# Patient Record
Sex: FEMALE | URBAN - METROPOLITAN AREA
[De-identification: names, ages, dates, MRNs, and addresses within clinical notes are randomized per-mention and may not be internally consistent; named-entity substitution may affect disease eponyms.]

---

## 2019-06-04 ENCOUNTER — OFFICE VISIT (OUTPATIENT)
Dept: URBAN - METROPOLITAN AREA CLINIC 18 | Facility: CLINIC | Age: 74
End: 2019-06-04
Payer: COMMERCIAL

## 2019-06-04 PROCEDURE — 92004 COMPRE OPH EXAM NEW PT 1/>: CPT | Performed by: OPTOMETRIST

## 2019-06-04 ASSESSMENT — VISUAL ACUITY
OS: 20/50
OD: 20/70

## 2019-06-04 ASSESSMENT — KERATOMETRY
OS: 44.50
OD: 44.13

## 2019-06-04 ASSESSMENT — INTRAOCULAR PRESSURE
OD: 15
OS: 18

## 2019-06-04 NOTE — IMPRESSION/PLAN
Impression: Age-related nuclear cataract, bilateral: H25.13. Plan: Cataracts account for the patient's complaints. Patient understands changing glasses will not improve vision. Recommend cataract evaluation with cataract surgeon. Briefly discussed advanced technology.

## 2019-06-17 ENCOUNTER — OFFICE VISIT (OUTPATIENT)
Dept: URBAN - METROPOLITAN AREA CLINIC 17 | Facility: CLINIC | Age: 74
End: 2019-06-17
Payer: COMMERCIAL

## 2019-06-17 PROCEDURE — 92004 COMPRE OPH EXAM NEW PT 1/>: CPT | Performed by: OPHTHALMOLOGY

## 2019-06-17 RX ORDER — OFLOXACIN 3 MG/ML
0.3 % SOLUTION/ DROPS OPHTHALMIC
Qty: 1 | Refills: 1 | Status: INACTIVE
Start: 2019-06-17 | End: 2019-08-12

## 2019-06-17 RX ORDER — PREDNISOLONE ACETATE 10 MG/ML
1 % SUSPENSION/ DROPS OPHTHALMIC
Qty: 1 | Refills: 1 | Status: INACTIVE
Start: 2019-06-17 | End: 2019-08-20

## 2019-06-17 ASSESSMENT — VISUAL ACUITY: OD: 20/70

## 2019-06-17 ASSESSMENT — INTRAOCULAR PRESSURE
OS: 14
OD: 15

## 2019-06-17 NOTE — IMPRESSION/PLAN
Impression: Age-related nuclear cataract, bilateral: H25.13. Condition: established, worsening. Symptoms: could improve with surgery. Vision: vision affected. Plan: Cataract accounts for patient's complaints. Reviewed risks, benefits, and procedure. Patient desires surgery, schedule ce/iol OD then OS, RL2, discussed lens options, distance refractive target, patient is clear for surgery in [place] [id number].

## 2019-07-18 ENCOUNTER — PRE-OPERATIVE VISIT (OUTPATIENT)
Dept: URBAN - METROPOLITAN AREA CLINIC 17 | Facility: CLINIC | Age: 74
End: 2019-07-18
Payer: COMMERCIAL

## 2019-07-18 DIAGNOSIS — H25.13 AGE-RELATED NUCLEAR CATARACT, BILATERAL: Primary | ICD-10-CM

## 2019-07-18 ASSESSMENT — PACHYMETRY
OS: 2.79
OD: 2.71
OS: 22.65
OD: 22.69

## 2019-08-05 ENCOUNTER — SURGERY (OUTPATIENT)
Dept: URBAN - METROPOLITAN AREA SURGERY 7 | Facility: SURGERY | Age: 74
End: 2019-08-05
Payer: COMMERCIAL

## 2019-08-05 PROCEDURE — 66984 XCAPSL CTRC RMVL W/O ECP: CPT | Performed by: OPHTHALMOLOGY

## 2019-08-06 ENCOUNTER — POST-OPERATIVE VISIT (OUTPATIENT)
Dept: URBAN - METROPOLITAN AREA CLINIC 18 | Facility: CLINIC | Age: 74
End: 2019-08-06

## 2019-08-06 DIAGNOSIS — Z09 ENCNTR FOR F/U EXAM AFT TRTMT FOR COND OTH THAN MALIG NEOPLM: Primary | ICD-10-CM

## 2019-08-06 PROCEDURE — 99024 POSTOP FOLLOW-UP VISIT: CPT | Performed by: OPTOMETRIST

## 2019-08-06 ASSESSMENT — INTRAOCULAR PRESSURE
OD: 12
OS: 11

## 2019-08-12 ENCOUNTER — POST-OPERATIVE VISIT (OUTPATIENT)
Dept: URBAN - METROPOLITAN AREA CLINIC 18 | Facility: CLINIC | Age: 74
End: 2019-08-12

## 2019-08-12 PROCEDURE — 99024 POSTOP FOLLOW-UP VISIT: CPT | Performed by: OPTOMETRIST

## 2019-08-12 ASSESSMENT — INTRAOCULAR PRESSURE
OS: 17
OD: 18

## 2019-08-19 ENCOUNTER — SURGERY (OUTPATIENT)
Dept: URBAN - METROPOLITAN AREA SURGERY 7 | Facility: SURGERY | Age: 74
End: 2019-08-19
Payer: COMMERCIAL

## 2019-08-19 PROCEDURE — 66984 XCAPSL CTRC RMVL W/O ECP: CPT | Performed by: OPHTHALMOLOGY

## 2019-08-20 ENCOUNTER — POST-OPERATIVE VISIT (OUTPATIENT)
Dept: URBAN - METROPOLITAN AREA CLINIC 18 | Facility: CLINIC | Age: 74
End: 2019-08-20

## 2019-08-20 PROCEDURE — 99024 POSTOP FOLLOW-UP VISIT: CPT | Performed by: OPTOMETRIST

## 2019-08-20 RX ORDER — OFLOXACIN 3 MG/ML
0.3 % SOLUTION/ DROPS OPHTHALMIC
Qty: 1 | Refills: 1 | Status: INACTIVE
Start: 2019-08-20 | End: 2019-08-26

## 2019-08-20 RX ORDER — PREDNISOLONE ACETATE 10 MG/ML
1 % SUSPENSION/ DROPS OPHTHALMIC
Qty: 1 | Refills: 1 | Status: INACTIVE
Start: 2019-08-20 | End: 2020-07-15

## 2019-08-20 ASSESSMENT — INTRAOCULAR PRESSURE
OD: 14
OS: 15

## 2019-08-26 ENCOUNTER — POST-OPERATIVE VISIT (OUTPATIENT)
Dept: URBAN - METROPOLITAN AREA CLINIC 18 | Facility: CLINIC | Age: 74
End: 2019-08-26

## 2019-08-26 PROCEDURE — 99024 POSTOP FOLLOW-UP VISIT: CPT | Performed by: OPTOMETRIST

## 2019-08-26 ASSESSMENT — INTRAOCULAR PRESSURE
OD: 13
OS: 11

## 2019-09-16 ENCOUNTER — POST-OPERATIVE VISIT (OUTPATIENT)
Dept: URBAN - METROPOLITAN AREA CLINIC 18 | Facility: CLINIC | Age: 74
End: 2019-09-16

## 2019-09-16 PROCEDURE — 99024 POSTOP FOLLOW-UP VISIT: CPT | Performed by: OPTOMETRIST

## 2019-09-16 ASSESSMENT — INTRAOCULAR PRESSURE
OS: 10
OD: 10

## 2020-07-15 ENCOUNTER — OFFICE VISIT (OUTPATIENT)
Dept: URBAN - METROPOLITAN AREA CLINIC 18 | Facility: CLINIC | Age: 75
End: 2020-07-15
Payer: COMMERCIAL

## 2020-07-15 DIAGNOSIS — H04.123 DRY EYE SYNDROME OF BILATERAL LACRIMAL GLANDS: Primary | Chronic | ICD-10-CM

## 2020-07-15 DIAGNOSIS — H43.813 VITREOUS DEGENERATION, BILATERAL: Chronic | ICD-10-CM

## 2020-07-15 DIAGNOSIS — Z96.1 PRESENCE OF INTRAOCULAR LENS: Chronic | ICD-10-CM

## 2020-07-15 PROCEDURE — 92014 COMPRE OPH EXAM EST PT 1/>: CPT | Performed by: OPTOMETRIST

## 2020-07-15 ASSESSMENT — INTRAOCULAR PRESSURE
OS: 11
OD: 9

## 2020-07-15 NOTE — IMPRESSION/PLAN
Impression: Presence of intraocular lens: Z96.1. Plan: Discussed diagnosis in detail with patient. No treatment is required at this time. Will continue to observe condition and or symptoms. Patient instructed to call if condition gets worse.  Order Refraction per patient request.